# Patient Record
Sex: FEMALE | Race: WHITE
[De-identification: names, ages, dates, MRNs, and addresses within clinical notes are randomized per-mention and may not be internally consistent; named-entity substitution may affect disease eponyms.]

---

## 2017-04-17 ENCOUNTER — HOSPITAL ENCOUNTER (EMERGENCY)
Dept: HOSPITAL 25 - ED | Age: 22
Discharge: HOME | End: 2017-04-17
Payer: COMMERCIAL

## 2017-04-17 VITALS — SYSTOLIC BLOOD PRESSURE: 118 MMHG | DIASTOLIC BLOOD PRESSURE: 70 MMHG

## 2017-04-17 DIAGNOSIS — N39.0: Primary | ICD-10-CM

## 2017-04-17 DIAGNOSIS — R30.0: ICD-10-CM

## 2017-04-17 DIAGNOSIS — R10.30: ICD-10-CM

## 2017-04-17 LAB
UR PREG INTERNAL CONTROL: (no result)
UR PREG KIT LOT#: (no result)

## 2017-04-17 PROCEDURE — 87086 URINE CULTURE/COLONY COUNT: CPT

## 2017-04-17 PROCEDURE — 87186 SC STD MICRODIL/AGAR DIL: CPT

## 2017-04-17 PROCEDURE — 81025 URINE PREGNANCY TEST: CPT

## 2017-04-17 PROCEDURE — 81015 MICROSCOPIC EXAM OF URINE: CPT

## 2017-04-17 PROCEDURE — 81003 URINALYSIS AUTO W/O SCOPE: CPT

## 2017-04-17 PROCEDURE — 87077 CULTURE AEROBIC IDENTIFY: CPT

## 2017-04-17 PROCEDURE — 99282 EMERGENCY DEPT VISIT SF MDM: CPT

## 2017-04-17 NOTE — ED
I, Oh,Nohemi, scribed for Jian Wright MD on 04/17/17 at 0541 .





GI/ HPI





- HPI Summary


HPI Summary: 





This 23 y/o female [employer] student presents to ED for suprapubic 

pain "my bladder hurt" and increased urgency for urination since 2 days ago. 

Voiding makes the bladder pain worse. Pt decided to visit ED when she became 

concerned with UTI. She denies any previous episode of UTI. 





- History of Current Complaint


Chief Complaint: EDUrogenitalProblems


Stated Complaint: POSS UTI/BURNS TO URINATE/BLOOD IN URINE


Hx Obtained From: Patient


Onset/Duration: Started Days Ago - 2 days ago, Still Present


Pain Intensity: 6


Location of Pain: Suprapubic


Pain Characteristics: Dull, Burning


Associated Signs and Symptoms: Positive: Dysuria, Abdominal Pain - suprapubic 

pain, UTI Symptoms





- Allergy/Home Medications


Allergies/Adverse Reactions: 


 Allergies











Allergy/AdvReac Type Severity Reaction Status Date / Time


 


No Known Allergies Allergy   Verified 04/17/17 04:56














PMH/Surg Hx/FS Hx/Imm Hx


Musculoskeletal History: 


   Denies: Hx Rheumatoid Arthritis, Hx Osteoporosis


Infectious Disease History: No


Infectious Disease History: 


   Denies: Traveled Outside the [place] in Last 30 Days





- Family History


Known Family History: 


   Negative: Cardiac Disease, Hypertension, Diabetes





- Social History


Alcohol Use: None


Hx Substance Use: No


Substance Use Type: Reports: None


Hx Tobacco Use: No


Smoking Status (MU): Never Smoked Tobacco





Review of Systems


Negative: Fever


Positive: Abdominal Pain - suprapubic pain


Positive: dysuria, urgency - increased urgency


All Other Systems Reviewed And Are Negative: Yes





Physical Exam


Triage Information Reviewed: Yes


Vital Signs On Initial Exam: 


 Initial Vitals











Temp Pulse Resp BP Pulse Ox


 


 98.4 F   80   14   117/72   100 


 


 04/17/17 04:44  04/17/17 04:44  04/17/17 04:44  04/17/17 04:44  04/17/17 04:44











Vital Signs Reviewed: Yes


Appearance: Positive: No Pain Distress, Thin


Skin: Positive: Warm


Head/Face: Positive: Normal Head/Face Inspection


Eyes: Positive: JAREN


Neck: Positive: Supple


Respiratory/Lung Sounds: Positive: Clear to Auscultation, Breath Sounds Present


Cardiovascular: Positive: RRR


Abdomen Description: Positive: Nontender, Soft.  Negative: CVA Tenderness (R), 

CVA Tenderness (L)


Bowel Sounds: Positive: Present


Musculoskeletal: Positive: Strength/ROM Intact


Neurological: Positive: Alert, Oriented to Person Place, Time


Psychiatric: Positive: Affect/Mood Appropriate





- [place] Coma Scale


Coma Scale Total: 15





Diagnostics





- Vital Signs


 Vital Signs











  Temp Pulse Resp BP Pulse Ox


 


 04/17/17 04:44  98.4 F  80  14  117/72  100














- Laboratory


Lab Results: 


 Lab Results











  04/17/17 Range/Units





  04:49 


 


Urine Color  Yellow  


 


Urine Appearance  Cloudy  


 


Urine pH  7.0  (5-9)  


 


Ur Specific Gravity  1.002 L  (1.010-1.030)  


 


Urine Protein  2+(100 mg/dl) H  (Negative)  


 


Urine Ketones  Negative  (Negative)  


 


Urine Blood  3+ H  (Negative)  


 


Urine Nitrate  Negative  (Negative)  


 


Urine Bilirubin  Negative  (Negative)  


 


Urine Urobilinogen  Negative  (Negative)  


 


Ur Leukocyte Esterase  3+ H  (Negative)  


 


Urine WBC (Auto)  3+(>20/hpf) H  (Absent)  


 


Urine RBC (Auto)  3+(>10/hpf) H  (Absent)  


 


Ur Squamous Epith Cells  Present H  (Absent)  


 


Urine Bacteria  2+ H  (Absent)  


 


Urine Glucose  Negative  (Negative)  











Lab Statement: Any lab studies that have been ordered have been reviewed, and 

results considered in the medical decision making process.





GIGU Course/Dx





- Diagnoses


Provider Diagnoses: 


 UTI (urinary tract infection)








Discharge





- Discharge Plan


Condition: Stable


Disposition: HOME


Prescriptions: 


Phenazopyridine 200 mg (NF) [Pyridium 200 MG tab] 200 mg PO TID #5 tab


Sulfamethox/Trimethoprim DS* [Bactrim /160 TAB*] 1 tab PO BID #14 tab


Patient Education Materials:  Sulfamethoxazole/Trimethoprim (By mouth), 

Phenazopyridine (By mouth), Urinary Tract Infection in Women (ED)


Referrals: 


[facility] RONY Terry [Primary Care Provider] - 2 Days





The documentation as recorded by the Ricardo ulrich Soohyun accurately reflects the 

service I personally performed and the decisions made by me, Jian Wright MD.

## 2018-09-30 ENCOUNTER — EMERGENCY (EMERGENCY)
Facility: HOSPITAL | Age: 23
LOS: 1 days | Discharge: HOME | End: 2018-09-30
Attending: EMERGENCY MEDICINE | Admitting: EMERGENCY MEDICINE
Payer: COMMERCIAL

## 2018-09-30 VITALS
DIASTOLIC BLOOD PRESSURE: 84 MMHG | HEART RATE: 74 BPM | TEMPERATURE: 97 F | SYSTOLIC BLOOD PRESSURE: 126 MMHG | OXYGEN SATURATION: 100 % | RESPIRATION RATE: 18 BRPM

## 2018-09-30 VITALS
OXYGEN SATURATION: 96 % | DIASTOLIC BLOOD PRESSURE: 55 MMHG | SYSTOLIC BLOOD PRESSURE: 103 MMHG | HEART RATE: 84 BPM | RESPIRATION RATE: 16 BRPM | TEMPERATURE: 98 F

## 2018-09-30 PROCEDURE — 70450 CT HEAD/BRAIN W/O DYE: CPT

## 2018-09-30 PROCEDURE — 99284 EMERGENCY DEPT VISIT MOD MDM: CPT | Mod: 25

## 2018-09-30 PROCEDURE — 70450 CT HEAD/BRAIN W/O DYE: CPT | Mod: 26

## 2018-09-30 PROCEDURE — 82962 GLUCOSE BLOOD TEST: CPT

## 2018-09-30 PROCEDURE — 96374 THER/PROPH/DIAG INJ IV PUSH: CPT

## 2018-09-30 PROCEDURE — 99285 EMERGENCY DEPT VISIT HI MDM: CPT | Mod: 25

## 2018-09-30 RX ORDER — SODIUM CHLORIDE 9 MG/ML
1000 INJECTION INTRAMUSCULAR; INTRAVENOUS; SUBCUTANEOUS ONCE
Qty: 0 | Refills: 0 | Status: COMPLETED | OUTPATIENT
Start: 2018-09-30 | End: 2018-09-30

## 2018-09-30 RX ORDER — ONDANSETRON 8 MG/1
4 TABLET, FILM COATED ORAL ONCE
Qty: 0 | Refills: 0 | Status: COMPLETED | OUTPATIENT
Start: 2018-09-30 | End: 2018-09-30

## 2018-09-30 RX ORDER — METOCLOPRAMIDE HCL 10 MG
10 TABLET ORAL ONCE
Qty: 0 | Refills: 0 | Status: DISCONTINUED | OUTPATIENT
Start: 2018-09-30 | End: 2018-09-30

## 2018-09-30 RX ADMIN — ONDANSETRON 4 MILLIGRAM(S): 8 TABLET, FILM COATED ORAL at 04:04

## 2018-09-30 RX ADMIN — SODIUM CHLORIDE 1000 MILLILITER(S): 9 INJECTION INTRAMUSCULAR; INTRAVENOUS; SUBCUTANEOUS at 04:03

## 2018-09-30 NOTE — ED ADULT NURSE NOTE - NSIMPLEMENTINTERV_GEN_ALL_ED
Implemented All Fall Risk Interventions:  [place] to call system. Call bell, personal items and telephone within reach. Instruct patient to call for assistance. Room bathroom lighting operational. Non-slip footwear when patient is off stretcher. Physically safe environment: no spills, clutter or unnecessary equipment. Stretcher in lowest position, wheels locked, appropriate side rails in place. Provide visual cue, wrist band, yellow gown, etc. Monitor gait and stability. Monitor for mental status changes and reorient to person, place, and time. Review medications for side effects contributing to fall risk. Reinforce activity limits and safety measures with patient and family.

## 2018-09-30 NOTE — ED PROVIDER NOTE - MEDICAL DECISION MAKING DETAILS
24 y/o f EtOH intox, cocaine use; given IV fluid, zofran, pt sleeping, CT head neg.  Will observe and reassess for sobriety

## 2018-09-30 NOTE — ED PROVIDER NOTE - OBJECTIVE STATEMENT
24 y/o f presents BIBA EtOH intox.  Pt with 2 friends who state pt was also doing cocaine tonight, they saw her vomiting several times.  Pt unable to provide history, arousable to painful stimulus.

## 2018-09-30 NOTE — ED ADULT NURSE NOTE - OBJECTIVE STATEMENT
pt. biba accompanied by friend, after consuming etoh and cocaine, as reported per friend, emesis en route with ems, pe unremarkable of trauma

## 2018-09-30 NOTE — ED ADULT NURSE REASSESSMENT NOTE - NS ED NURSE REASSESS COMMENT FT1
pt. now alert/oriented x 4, nad, states ready to go home, nad, ambulated to restroom with steady gait noted, provider notified and will re-assess, pt. utd with understanding verbalized

## 2018-09-30 NOTE — ED PROVIDER NOTE - ATTENDING CONTRIBUTION TO CARE
24 yo female no pmh c/o EtOH intox, cocaine abuse; denies other drugs.  Denies injury or other complaint.  Intoxicated, nad, nc/at, perrl, lung cta, heart reg, abd soft/nt, ext no c/c/e, no gross neuro deficits.  MS much improved at time of my exam as compared to pa's.  CT head neg.  Plan to dc when awake and ambulatory w steady gait.

## 2018-10-04 DIAGNOSIS — R41.82 ALTERED MENTAL STATUS, UNSPECIFIED: ICD-10-CM

## 2018-10-04 DIAGNOSIS — F10.129 ALCOHOL ABUSE WITH INTOXICATION, UNSPECIFIED: ICD-10-CM
